# Patient Record
Sex: FEMALE | Race: WHITE | Employment: FULL TIME | URBAN - METROPOLITAN AREA
[De-identification: names, ages, dates, MRNs, and addresses within clinical notes are randomized per-mention and may not be internally consistent; named-entity substitution may affect disease eponyms.]

---

## 2022-12-31 ENCOUNTER — APPOINTMENT (OUTPATIENT)
Dept: GENERAL RADIOLOGY | Age: 62
End: 2022-12-31
Payer: COMMERCIAL

## 2022-12-31 ENCOUNTER — HOSPITAL ENCOUNTER (EMERGENCY)
Age: 62
Discharge: HOME | End: 2022-12-31
Attending: STUDENT IN AN ORGANIZED HEALTH CARE EDUCATION/TRAINING PROGRAM
Payer: COMMERCIAL

## 2022-12-31 VITALS
RESPIRATION RATE: 16 BRPM | SYSTOLIC BLOOD PRESSURE: 110 MMHG | DIASTOLIC BLOOD PRESSURE: 80 MMHG | HEART RATE: 68 BPM | TEMPERATURE: 98.7 F | OXYGEN SATURATION: 98 %

## 2022-12-31 DIAGNOSIS — S89.91XA INJURY OF RIGHT KNEE, INITIAL ENCOUNTER: Primary | ICD-10-CM

## 2022-12-31 PROCEDURE — 73560 X-RAY EXAM OF KNEE 1 OR 2: CPT

## 2022-12-31 PROCEDURE — 6370000000 HC RX 637 (ALT 250 FOR IP): Performed by: STUDENT IN AN ORGANIZED HEALTH CARE EDUCATION/TRAINING PROGRAM

## 2022-12-31 PROCEDURE — 99283 EMERGENCY DEPT VISIT LOW MDM: CPT

## 2022-12-31 PROCEDURE — 73552 X-RAY EXAM OF FEMUR 2/>: CPT

## 2022-12-31 RX ORDER — OXYCODONE HYDROCHLORIDE AND ACETAMINOPHEN 5; 325 MG/1; MG/1
1 TABLET ORAL EVERY 6 HOURS PRN
Qty: 12 TABLET | Refills: 0 | Status: SHIPPED | OUTPATIENT
Start: 2022-12-31 | End: 2023-01-03

## 2022-12-31 RX ORDER — OXYCODONE HYDROCHLORIDE 5 MG/1
5 TABLET ORAL ONCE
Status: COMPLETED | OUTPATIENT
Start: 2022-12-31 | End: 2022-12-31

## 2022-12-31 RX ADMIN — OXYCODONE HYDROCHLORIDE 5 MG: 5 TABLET ORAL at 14:18

## 2022-12-31 ASSESSMENT — PAIN DESCRIPTION - LOCATION: LOCATION: LEG

## 2022-12-31 ASSESSMENT — PAIN SCALES - GENERAL
PAINLEVEL_OUTOF10: 5
PAINLEVEL_OUTOF10: 8
PAINLEVEL_OUTOF10: 9

## 2022-12-31 ASSESSMENT — PAIN DESCRIPTION - ORIENTATION: ORIENTATION: RIGHT

## 2022-12-31 ASSESSMENT — PAIN - FUNCTIONAL ASSESSMENT: PAIN_FUNCTIONAL_ASSESSMENT: 0-10

## 2022-12-31 NOTE — ED PROVIDER NOTES
ATTENDING PHYSICIAN NOTE       Date of evaluation: 12/31/2022    Chief Complaint     Leg Injury (Missed last step and fell down the stair. Felt pop in R leg. Pain in R thigh and R knee. )      History of Present Illness     Jazlyn Jensen is a 57 y.o. female who presents right leg injury. Patient reports that she was moving [employer] boxes down the stairs when she missed the last 2 steps and rotated her right knee. States that she felt a pop and was unable to ambulate. She has been unable to bear weight since the accident occurred. Pain is mostly in the right knee, nonradiating, worsens with any movement. Pain is mostly laterally. She tried icing it with no relief. Denies prior injury. She has not taken anything for the pain. She denies any distal leg pain. She denies any right hip or knee back pain. She denies any new numbness or tingling. Denies any head trauma or new neck pain. She denies additional extremity injury. Denies any recent fever, cough, shortness of breath, chest pain, palpitations, vomiting, abdominal pain, change in bowel or bladder, syncope or dizziness. Review of Systems     Review of Systems   Constitutional:  Negative for appetite change, fatigue and fever. HENT:  Negative for congestion and sinus pressure. Eyes:  Negative for visual disturbance. Respiratory:  Negative for cough and shortness of breath. Cardiovascular:  Negative for chest pain and palpitations. Gastrointestinal:  Negative for abdominal pain, constipation, diarrhea, nausea and vomiting. Endocrine: Negative for polydipsia and polyuria. Genitourinary:  Negative for dysuria, flank pain and hematuria. Musculoskeletal:  Positive for arthralgias and gait problem. Negative for back pain, neck pain and neck stiffness. Skin:  Negative for color change and pallor. Neurological:  Negative for dizziness, syncope, weakness, light-headedness and numbness. Psychiatric/Behavioral:  Negative for confusion. Past Medical, Surgical, Family, and Social History     She has no past medical history on file. She has no past surgical history on file. Her family history is not on file. She reports that she has never smoked. She has never used smokeless tobacco. She reports that she does not drink alcohol and does not use drugs. Medications     Previous Medications    No medications on file       Allergies     She has No Known Allergies. Physical Exam     INITIAL VITALS: BP: 109/79, Temp: 98.7 °F (37.1 °C), Heart Rate: 64, Resp: 18, SpO2: 97 %   Physical Exam  Vitals and nursing note reviewed. Constitutional:       General: She is not in acute distress. Appearance: She is not toxic-appearing. HENT:      Head: Normocephalic and atraumatic. Right Ear: External ear normal.      Left Ear: External ear normal.      Nose: Nose normal. No congestion or rhinorrhea. Mouth/Throat:      Pharynx: Oropharynx is clear. No oropharyngeal exudate or posterior oropharyngeal erythema. Eyes:      Extraocular Movements: Extraocular movements intact. Conjunctiva/sclera: Conjunctivae normal.   Cardiovascular:      Rate and Rhythm: Normal rate and regular rhythm. Pulses: Normal pulses. Heart sounds: Normal heart sounds. No murmur heard. Pulmonary:      Effort: Pulmonary effort is normal. No respiratory distress. Breath sounds: Normal breath sounds. Abdominal:      General: There is no distension. Palpations: Abdomen is soft. Tenderness: There is no abdominal tenderness. There is no right CVA tenderness, left CVA tenderness, guarding or rebound. Musculoskeletal:      Cervical back: Normal range of motion and neck supple. No rigidity. Comments: Right knee exam tenderness to palpation over the patella, laxity and pain with Anais, slight laxity with Lachman, range motion limited by pain. She does have an effusion present.   No tenderness along distal aspect of right lower extremity. She has no right hip pain. Skin:     General: Skin is warm. Capillary Refill: Capillary refill takes less than 2 seconds. Coloration: Skin is not jaundiced. Neurological:      General: No focal deficit present. Mental Status: She is alert. Cranial Nerves: No cranial nerve deficit. Sensory: No sensory deficit. Motor: No weakness. Psychiatric:         Mood and Affect: Mood normal.         Behavior: Behavior normal.       Diagnostic Results       RADIOLOGY:  XR KNEE RIGHT (1-2 VIEWS)   Final Result   No acute osseous injury of the right femur or knee. Possible trace right knee effusion. XR FEMUR RIGHT (MIN 2 VIEWS)   Final Result   No acute osseous injury of the right femur or knee. Possible trace right knee effusion. LABS:   No results found for this visit on 12/31/22. ED BEDSIDE ULTRASOUND:  No results found. RECENT VITALS:  BP: 109/79,Temp: 98.7 °F (37.1 °C), Heart Rate: 64, Resp: 18, SpO2: 97 %     Procedures         ED Course     Nursing Notes, Past Medical Hx, Past Surgical Hx, Social Hx,Allergies, and Family Hx were reviewed. patient was given the following medications:  Orders Placed This Encounter   Medications    oxyCODONE (ROXICODONE) immediate release tablet 5 mg    oxyCODONE-acetaminophen (PERCOCET) 5-325 MG per tablet     Sig: Take 1 tablet by mouth every 6 hours as needed for Pain for up to 3 days. Intended supply: 3 days. Take lowest dose possible to manage pain Max Daily Amount: 4 tablets     Dispense:  12 tablet     Refill:  0       CONSULTS:  None    MEDICAL DECISIONMAKING / ASSESSMENT / Rene Bobby is a 57 y.o. female who presents with right knee injury. She presented normotensive, afebrile, heart rate of 64, respiratory rate of 18 and satting at 97% on room air. Please see exam above.   Given history and exam my differential diagnosis includes but is not limited to patellar dislocation, tibia-fibula fracture, femur fracture, meniscus tear, ACL or PCL injury. There is no overlying redness or warmth to suggest underlying septic joint or gout. I obtained imaging studies as noted below    I interpreted the imaging and note  X-ray of her right knee with no acute osseous injury. There is trace right knee effusion  X-ray of her right femur with no acute osseous injury. Patient was provided with knee brace and recommended that she follow-up with on-call orthopedic surgeon Dr. Maddy Chacon. She was provided crutches. I suspect underlying meniscus or ligamentous injury. All questions and concerns were addressed. Strict return precautions reviewed. Patient stable for discharge at this time. Clinical Impression     1. Injury of right knee, initial encounter        Disposition     PATIENT REFERRED TO:  Paco Adamson MD  [facility] [address]  [phone]    Go to       DISCHARGE MEDICATIONS:  New Prescriptions    OXYCODONE-ACETAMINOPHEN (PERCOCET) 5-325 MG PER TABLET    Take 1 tablet by mouth every 6 hours as needed for Pain for up to 3 days. Intended supply: 3 days.  Take lowest dose possible to manage pain Max Daily Amount: 4 tablets       DISPOSITION Discharge - Pending Orders Complete 12/31/2022 02:39:53 PM          Krystina Whelan MD  12/31/22 [id number]

## 2022-12-31 NOTE — DISCHARGE INSTRUCTIONS
You were seen in the emergency department for right knee injury. Fortunately your x-ray did not show any acute fracture. I am concerned that you may have injured your meniscus or ligament in your knee that is causing your pain. Please call the number provided to follow-up with our on-call orthopedic specialist.  Afshin Gallegos will likely need an MRI. Please return to the emergency department if you develop any new or concerning changes to your health.

## 2022-12-31 NOTE — Clinical Note
Janneth Rivera was seen and treated in our emergency department on 12/31/2022. She may return to work on 01/05/2023. If you have any questions or concerns, please don't hesitate to call.       Wei Jimenez MD

## 2023-01-04 ENCOUNTER — OFFICE VISIT (OUTPATIENT)
Dept: ORTHOPEDIC SURGERY | Age: 63
End: 2023-01-04
Payer: MEDICARE

## 2023-01-04 VITALS — BODY MASS INDEX: 29.45 KG/M2 | WEIGHT: 150 LBS | HEIGHT: 60 IN

## 2023-01-04 DIAGNOSIS — M79.651 PAIN OF RIGHT THIGH: Primary | ICD-10-CM

## 2023-01-04 PROCEDURE — G8419 CALC BMI OUT NRM PARAM NOF/U: HCPCS | Performed by: ORTHOPAEDIC SURGERY

## 2023-01-04 PROCEDURE — 1036F TOBACCO NON-USER: CPT | Performed by: ORTHOPAEDIC SURGERY

## 2023-01-04 PROCEDURE — G8427 DOCREV CUR MEDS BY ELIG CLIN: HCPCS | Performed by: ORTHOPAEDIC SURGERY

## 2023-01-04 PROCEDURE — 3017F COLORECTAL CA SCREEN DOC REV: CPT | Performed by: ORTHOPAEDIC SURGERY

## 2023-01-04 PROCEDURE — 99202 OFFICE O/P NEW SF 15 MIN: CPT | Performed by: ORTHOPAEDIC SURGERY

## 2023-01-04 PROCEDURE — G8484 FLU IMMUNIZE NO ADMIN: HCPCS | Performed by: ORTHOPAEDIC SURGERY

## 2023-01-04 RX ORDER — MULTIVITAMIN
1 TABLET ORAL DAILY
COMMUNITY

## 2023-01-04 RX ORDER — OXYCODONE HYDROCHLORIDE AND ACETAMINOPHEN 5; 325 MG/1; MG/1
1 TABLET ORAL EVERY 6 HOURS PRN
Qty: 12 TABLET | Refills: 0 | Status: SHIPPED | OUTPATIENT
Start: 2023-01-04 | End: 2023-01-07

## 2023-01-04 RX ORDER — ATORVASTATIN CALCIUM 20 MG/1
TABLET, FILM COATED ORAL
COMMUNITY
Start: 2022-10-09

## 2023-01-04 RX ORDER — IBUPROFEN 200 MG
CAPSULE ORAL
COMMUNITY
Start: 2009-05-14

## 2023-01-04 NOTE — LETTER
[address] [place] [address] [facility] [address]  Phone: [phone]  Fax: [phone]    Breanna Frias MD        January 4, 2023     Patient: Lucero Galloway   YOB: 1960   Date of Visit: 1/4/2023       To Whom It May Concern: It is my medical opinion that Lucreo Galloway may return to work on 1/9/2023. If you have any questions or concerns, please don't hesitate to call.     Sincerely,        Breanna Frias MD

## 2023-01-04 NOTE — PROGRESS NOTES
ORTHOPAEDIC SURGERY INITIAL EVALUATION NOTE  Chief Complaint   Patient presents with    Knee Pain     R knee      HISTORY OF PRESENT ILLNESS:  25-year-old female presents for evaluation of a right knee/distal thigh injury. She reports that she missed the last step going down to the basement and Twisted her leg awkwardly. She reports feeling a sharp pain and hearing a pop. She indicates the region of interest is superior lateral to the kneecap a few inches. She presented to the emergency department on 12/31/2022. X-rays were negative. She was provided with a low-profile hinged brace and crutches. She has had improvement in her pain. She has been able to except more weight on the lower extremity. She has had some trouble with bending the knee related to tightness and pain. There is still a sore spot there. She indicates that she has provided complete rest by laying on the couch most days and not moving. She believes the pain medicine has helped her as well. No past medical history on file. No current outpatient medications on file. No current facility-administered medications for this visit. No past surgical history on file. No Known Allergies    No family history on file.     Social History     Socioeconomic History    Marital status: Single     Spouse name: Not on file    Number of children: Not on file    Years of education: Not on file    Highest education level: Not on file   Occupational History    Not on file   Tobacco Use    Smoking status: Never    Smokeless tobacco: Never   Vaping Use    Vaping Use: Never used   Substance and Sexual Activity    Alcohol use: Never    Drug use: Never    Sexual activity: Not on file   Other Topics Concern    Not on file   Social History Narrative    Not on file     Social Determinants of Health     Financial Resource Strain: Not on file   Food Insecurity: Not on file   Transportation Needs: Not on file   Physical Activity: Not on file   Stress: Not on file   Social Connections: Not on file   Intimate Partner Violence: Not on file   Housing Stability: Not on file     Review of Systems: Please see today's intake form for a complete review of systems. PHYSICAL EXAM  Gen: awake, alert, oriented  HEENT: atraumatic, normocephalic  Neck: supple  Resp: equal chest rise bilaterally, no audible wheezes, no accessory muscle use. CV: Lower extremities warm and well perfused. Abd: soft, nontender   Focused examination of the right lower extremity:  No cuts, open wounds, or abrasions to right knee. There is no bruising or ecchymosis. There is a focal area of tenderness about the superior lateral patella/distal IT band. There is intact quad function. She can maintain a straight leg raise against resistance. Knee range of motion is full extension to approximately 90 degrees of flexion before pain limits exam.  There is negative anterior posterior drawer test.  Negative Lachman. No instability appreciated with varus or valgus stress at 0 or 30 degrees of flexion. No significant pain with Anais exam.  Sensation is intact to light touch in deep peroneal, superficial peroneal, tibial, sural, and saphenous nerve distributions. Motor function is intact to EHL, FHL, tibialis anterior, and gastroc. There is brisk capillary refill to the toes and a strong palpable dorsalis pedis pulse. Compartments are soft and compressible. There is no calf tenderness and a negative Homans' sign. Gait: The patient was observed ambulating with mildly antalgic gait with use of crutches. RADIOGRAPHIC EXAM:  EXAMINATION:   2 XRAY VIEWS OF THE RIGHT KNEE; 4 XRAY VIEWS OF THE RIGHT FEMUR       12/31/2022 1:57 pm       COMPARISON:   None.        HISTORY:   ORDERING SYSTEM PROVIDED HISTORY: Injury   TECHNOLOGIST PROVIDED HISTORY:   Reason for exam:->Injury   Reason for Exam: fall today, right knee/leg pain       FINDINGS:   Right femur-       The alignment of the right femur is normal.  There is no acute fracture or   dislocation. Soft tissues are unremarkable. Right knee-       The alignment of the right knee is normal.  No fracture or dislocation is   identified. The study is equivocal for trace joint effusion. Impression   No acute osseous injury of the right femur or knee. Possible trace right knee effusion     ASSESSEMENT AND PLAN    57 y.o. female with the following orthopaedic surgery problems:    Iliotibial band strain  Right knee patella subluxation    I reviewed the x-rays in detail with the patient. I recommended conservative management in the form of relative rest, ice, compressive Ace wrap, OTC medications. The patient will return for repeat evaluation in 2 weeks if not improving. She is already improved tremendously from her initial injury. I suspect that will resolve on eventfully without intervention. If she continues to have significant pain or is not improving over the course of 2 weeks, advanced imaging would be considered.   She will remain off of work for a couple of weeks to provide for relative rest.    Xiomara Cullen MD